# Patient Record
Sex: FEMALE | Race: WHITE | NOT HISPANIC OR LATINO | URBAN - METROPOLITAN AREA
[De-identification: names, ages, dates, MRNs, and addresses within clinical notes are randomized per-mention and may not be internally consistent; named-entity substitution may affect disease eponyms.]

---

## 2017-01-31 ASSESSMENT — MIFFLIN-ST. JEOR: SCORE: 684.33

## 2017-02-01 ENCOUNTER — ANESTHESIA - HEALTHEAST (OUTPATIENT)
Dept: SURGERY | Facility: HOSPITAL | Age: 82
End: 2017-02-01

## 2017-02-01 ENCOUNTER — SURGERY - HEALTHEAST (OUTPATIENT)
Dept: SURGERY | Facility: HOSPITAL | Age: 82
End: 2017-02-01

## 2017-02-01 ASSESSMENT — MIFFLIN-ST. JEOR
SCORE: 699.75
SCORE: 743.75

## 2017-02-02 ASSESSMENT — MIFFLIN-ST. JEOR: SCORE: 711.09

## 2017-02-04 ASSESSMENT — MIFFLIN-ST. JEOR: SCORE: 746.47

## 2017-02-05 ASSESSMENT — MIFFLIN-ST. JEOR: SCORE: 743.75

## 2017-03-08 ENCOUNTER — AMBULATORY - HEALTHEAST (OUTPATIENT)
Dept: CARDIOLOGY | Facility: CLINIC | Age: 82
End: 2017-03-08

## 2021-05-24 ENCOUNTER — RECORDS - HEALTHEAST (OUTPATIENT)
Dept: ADMINISTRATIVE | Facility: CLINIC | Age: 86
End: 2021-05-24

## 2021-05-27 ENCOUNTER — RECORDS - HEALTHEAST (OUTPATIENT)
Dept: ADMINISTRATIVE | Facility: CLINIC | Age: 86
End: 2021-05-27

## 2021-05-28 ENCOUNTER — RECORDS - HEALTHEAST (OUTPATIENT)
Dept: ADMINISTRATIVE | Facility: CLINIC | Age: 86
End: 2021-05-28

## 2021-05-30 VITALS — HEIGHT: 60 IN | BODY MASS INDEX: 19.36 KG/M2 | WEIGHT: 98.6 LBS

## 2021-06-08 NOTE — ANESTHESIA PREPROCEDURE EVALUATION
"Anesthesia Evaluation        Airway   Mallampati: II  Neck ROM: full   Pulmonary - negative ROS and normal exam    breath sounds clear to auscultation  (-) asthma                         Cardiovascular - normal exam  Exercise tolerance: > or = 4 METS  (+) hypertension, valvular problems/murmurs AS, ,     ECG reviewed (Cardiac Echo reviewed)  Rhythm: regular  Rate: normal,         Neuro/Psych - negative ROS     Endo/Other - negative ROS      GI/Hepatic/Renal    (-) GERD          Dental - normal exam                        Anesthesia Plan  Planned anesthetic: general endotracheal  89 y/o active female with hip fracture with asymptomatic AS. Valve diameter 0.8mm by Echo (\"moderate\"). SAB contraindicated.  ASA 3   Induction: intravenous   Anesthetic plan and risks discussed with: patient and child/children  Anesthesia plan special considerations: antiemetics,   Post-op plan: routine recovery  DNR/DNI status   DNR/DNI status discussed with patient (No DNR/DNI status per patient and children-Full Code).          "

## 2021-06-08 NOTE — ANESTHESIA POSTPROCEDURE EVALUATION
Patient: Natalie Calderon  INTERNAL FIXATION, FRACTURE, HIP  Anesthesia type: general    Patient location: PACU  Last vitals:   Vitals:    02/01/17 1915   BP: 114/58   Pulse: 83   Resp: 14   Temp:    SpO2: 100%     Post vital signs: stable   Level of consciousness: awake and responds to simple questions  Post-anesthesia pain: pain controlled  Post-anesthesia nausea and vomiting: no  Pulmonary: needs assistance on ventilator    Cardiovascular: stable and blood pressure at baseline  Hydration: adequate  Anesthetic events: yes: unexpected ICU admission    QCDR Measures:  ASA# 11 - Jayashree-op Cardiac Arrest: ASA11B - Patient did NOT experience unanticipated cardiac arrest  ASA# 12 - Jayashree-op Mortality Rate: ASA12B - Patient did NOT die  ASA# 13 - PACU Re-Intubation Rate: ASA13B - Patient did NOT require a new airway mgmt  ASA# 10 - Composite Anes Safety: ASA10A - No serious adverse event  ASA# 38 - New Corneal Injury: ASA38A - No new exposure keratitis or corneal abrasion in PACU    Additional Notes:  Transfer discussed with family and nurse practitioner intensivist

## 2021-06-08 NOTE — ANESTHESIA CARE TRANSFER NOTE
Last vitals:   Vitals:    02/01/17 1813   BP: 121/60   Pulse: 85   Resp: 16   Temp: 37  C (98.6  F)   SpO2: 100%     Patient's level of consciousness is drowsy  Spontaneous respirations: no: ETT on Ventilator  Maintains airway independently: no: ETT in place, patient placed on ventilator by RT in Recovery  Dentition unchanged: yes  Oropharynx: endotracheal tube in place    QCDR Measures:  ASA# 20 - Surgical Safety Checklist: ASA20A - Safety Checks Done  PQRS# 430 - Adult PONV Prevention: NA - Not adult patient, not GA or 3 or more risk factors NOT present  ASA# 8 - Peds PONV Prevention: NA - Not pediatric patient, not GA or 2 or more risk factors NOT present  PQRS# 424 - Jayashree-op Temp Management: 4559F - At least one body temp DOCUMENTED => 35.5C or 95.9F within required timeframe  PQRS# 426 - PACU Transfer Protocol: - Transfer of care checklist used  ASA# 14 - Acute Post-op Pain: ASA14B - Patient did NOT experience pain >= 7 out of 10    I completed my SBAR handoff to the receiving nurse per policy and procedure.  Patient stable, VSS, appears comfortable.  Placed on ventilator by RT on arrival to recovery.    Koaml Koroma

## 2021-06-15 PROBLEM — J96.90 RESPIRATORY FAILURE (H): Status: ACTIVE | Noted: 2017-02-12

## 2021-06-15 PROBLEM — J96.00 ACUTE RESPIRATORY FAILURE (H): Status: ACTIVE | Noted: 2017-02-01

## 2021-06-15 PROBLEM — S72.009A HIP FRACTURE (H): Status: ACTIVE | Noted: 2017-02-01

## 2021-07-13 ENCOUNTER — RECORDS - HEALTHEAST (OUTPATIENT)
Dept: ADMINISTRATIVE | Facility: CLINIC | Age: 86
End: 2021-07-13

## 2021-07-21 ENCOUNTER — RECORDS - HEALTHEAST (OUTPATIENT)
Dept: ADMINISTRATIVE | Facility: CLINIC | Age: 86
End: 2021-07-21